# Patient Record
Sex: MALE | Race: BLACK OR AFRICAN AMERICAN | NOT HISPANIC OR LATINO
[De-identification: names, ages, dates, MRNs, and addresses within clinical notes are randomized per-mention and may not be internally consistent; named-entity substitution may affect disease eponyms.]

---

## 2017-12-06 ENCOUNTER — APPOINTMENT (OUTPATIENT)
Dept: OTOLARYNGOLOGY | Facility: CLINIC | Age: 57
End: 2017-12-06
Payer: COMMERCIAL

## 2017-12-06 VITALS
BODY MASS INDEX: 33.11 KG/M2 | TEMPERATURE: 98.2 F | WEIGHT: 206 LBS | HEART RATE: 109 BPM | DIASTOLIC BLOOD PRESSURE: 77 MMHG | HEIGHT: 66 IN | SYSTOLIC BLOOD PRESSURE: 143 MMHG | OXYGEN SATURATION: 97 %

## 2017-12-06 PROCEDURE — 69210 REMOVE IMPACTED EAR WAX UNI: CPT

## 2017-12-06 PROCEDURE — 99213 OFFICE O/P EST LOW 20 MIN: CPT | Mod: 25

## 2017-12-06 PROCEDURE — 31575 DIAGNOSTIC LARYNGOSCOPY: CPT

## 2018-02-26 ENCOUNTER — APPOINTMENT (OUTPATIENT)
Dept: UROLOGY | Facility: CLINIC | Age: 58
End: 2018-02-26

## 2018-06-18 ENCOUNTER — APPOINTMENT (OUTPATIENT)
Dept: UROLOGY | Facility: CLINIC | Age: 58
End: 2018-06-18
Payer: COMMERCIAL

## 2018-06-18 VITALS — SYSTOLIC BLOOD PRESSURE: 155 MMHG | HEART RATE: 69 BPM | DIASTOLIC BLOOD PRESSURE: 78 MMHG | TEMPERATURE: 98.4 F

## 2018-06-18 PROCEDURE — 99203 OFFICE O/P NEW LOW 30 MIN: CPT

## 2018-06-19 LAB
APPEARANCE: CLEAR
BACTERIA: NEGATIVE
BILIRUBIN URINE: NEGATIVE
BLOOD URINE: NEGATIVE
COLOR: YELLOW
GLUCOSE QUALITATIVE U: NEGATIVE MG/DL
HYALINE CASTS: 1 /LPF
KETONES URINE: NEGATIVE
LEUKOCYTE ESTERASE URINE: NEGATIVE
MICROSCOPIC-UA: NORMAL
NITRITE URINE: NEGATIVE
PH URINE: 5.5
PROTEIN URINE: NEGATIVE MG/DL
PSA FREE FLD-MCNC: 22.3
PSA FREE SERPL-MCNC: 0.72 NG/ML
PSA SERPL-MCNC: 3.23 NG/ML
RED BLOOD CELLS URINE: 2 /HPF
SPECIFIC GRAVITY URINE: 1.02
SQUAMOUS EPITHELIAL CELLS: 0 /HPF
UROBILINOGEN URINE: NEGATIVE MG/DL
WHITE BLOOD CELLS URINE: 0 /HPF

## 2018-06-20 ENCOUNTER — RESULT REVIEW (OUTPATIENT)
Age: 58
End: 2018-06-20

## 2018-06-20 LAB — BACTERIA UR CULT: NORMAL

## 2018-08-01 ENCOUNTER — APPOINTMENT (OUTPATIENT)
Dept: UROLOGY | Facility: CLINIC | Age: 58
End: 2018-08-01
Payer: COMMERCIAL

## 2018-08-01 VITALS
OXYGEN SATURATION: 98 % | TEMPERATURE: 98.8 F | HEIGHT: 66 IN | DIASTOLIC BLOOD PRESSURE: 85 MMHG | BODY MASS INDEX: 33.11 KG/M2 | HEART RATE: 66 BPM | WEIGHT: 206 LBS | SYSTOLIC BLOOD PRESSURE: 156 MMHG

## 2018-08-01 PROCEDURE — 99213 OFFICE O/P EST LOW 20 MIN: CPT

## 2018-08-06 ENCOUNTER — FORM ENCOUNTER (OUTPATIENT)
Age: 58
End: 2018-08-06

## 2018-08-07 ENCOUNTER — OUTPATIENT (OUTPATIENT)
Dept: OUTPATIENT SERVICES | Facility: HOSPITAL | Age: 58
LOS: 1 days | End: 2018-08-07
Payer: COMMERCIAL

## 2018-08-07 ENCOUNTER — APPOINTMENT (OUTPATIENT)
Dept: ULTRASOUND IMAGING | Facility: HOSPITAL | Age: 58
End: 2018-08-07

## 2018-08-07 PROCEDURE — 76770 US EXAM ABDO BACK WALL COMP: CPT | Mod: 26

## 2018-08-07 PROCEDURE — 76770 US EXAM ABDO BACK WALL COMP: CPT

## 2018-08-13 ENCOUNTER — APPOINTMENT (OUTPATIENT)
Dept: UROLOGY | Facility: CLINIC | Age: 58
End: 2018-08-13
Payer: COMMERCIAL

## 2018-08-13 VITALS — TEMPERATURE: 97.9 F | HEART RATE: 97 BPM | DIASTOLIC BLOOD PRESSURE: 80 MMHG | SYSTOLIC BLOOD PRESSURE: 132 MMHG

## 2018-08-13 PROCEDURE — 52000 CYSTOURETHROSCOPY: CPT

## 2018-11-14 ENCOUNTER — APPOINTMENT (OUTPATIENT)
Dept: UROLOGY | Facility: CLINIC | Age: 58
End: 2018-11-14
Payer: COMMERCIAL

## 2018-11-14 VITALS
RESPIRATION RATE: 18 BRPM | WEIGHT: 232 LBS | DIASTOLIC BLOOD PRESSURE: 78 MMHG | OXYGEN SATURATION: 100 % | TEMPERATURE: 98.7 F | BODY MASS INDEX: 38.65 KG/M2 | SYSTOLIC BLOOD PRESSURE: 144 MMHG | HEIGHT: 65 IN | HEART RATE: 60 BPM

## 2018-11-14 PROCEDURE — 99213 OFFICE O/P EST LOW 20 MIN: CPT

## 2019-03-04 ENCOUNTER — APPOINTMENT (OUTPATIENT)
Dept: UROLOGY | Facility: CLINIC | Age: 59
End: 2019-03-04

## 2019-04-08 ENCOUNTER — APPOINTMENT (OUTPATIENT)
Dept: UROLOGY | Facility: CLINIC | Age: 59
End: 2019-04-08
Payer: COMMERCIAL

## 2019-04-08 VITALS — HEART RATE: 64 BPM | TEMPERATURE: 98.8 F | SYSTOLIC BLOOD PRESSURE: 146 MMHG | DIASTOLIC BLOOD PRESSURE: 79 MMHG

## 2019-04-08 PROCEDURE — 99213 OFFICE O/P EST LOW 20 MIN: CPT

## 2019-04-08 NOTE — ASSESSMENT
[FreeTextEntry1] : 57yo male with BPH, episode of acute urinary retention July 2018\par Approx 70gm prostate with large median lobe on cystoscopy August 2018\par Continue tamsulosin BID, finasteride daily\par Check PSA today\par f/u 6 months\par

## 2019-04-08 NOTE — LETTER BODY
[Dear  ___] : Dear  [unfilled], [Courtesy Letter:] : I had the pleasure of seeing your patient, [unfilled], in my office today. [Please see my note below.] : Please see my note below. [Consult Closing:] : Thank you very much for allowing me to participate in the care of this patient.  If you have any questions, please do not hesitate to contact me. [Sincerely,] : Sincerely, [FreeTextEntry2] : Yoon Durán MD\par [address]\[place] [address] \par  [FreeTextEntry3] : Jack Gallego MD\par Urologic Oncology\par Department of Urology\par [facility]\par \par \par [employer] at [facility] \par \par

## 2019-04-08 NOTE — HISTORY OF PRESENT ILLNESS
[FreeTextEntry1] : This is a 59yo male referred for evaluation of BPH. He reports nocturia x3, frequency, post void dribbling. He recently started tamsulosin twice daily prescribed by his PCP which has been helping. No recent PSA or urine testing available. \par \par 8/01/18 Here for f/u. Developed acute retention 4 days ago and he placed at [facility]. No other testing was performed. Here for trial of void\par \par 11/14/18 Here for f/u. Started finasteride in August. Had cystoscopy which showed enlarged prostate with large median lobe. He has not noticed much improvement since starting medication but he is satisfied. \par \par 4/08/19 Here for f/u. Doing well, satisfied with voiding symptoms.  [None] : None

## 2019-04-08 NOTE — PHYSICAL EXAM
[General Appearance - Well Developed] : well developed [General Appearance - Well Nourished] : well nourished [Normal Appearance] : normal appearance [Well Groomed] : well groomed [General Appearance - In No Acute Distress] : no acute distress [Abdomen Soft] : soft [Abdomen Tenderness] : non-tender [Costovertebral Angle Tenderness] : no ~M costovertebral angle tenderness [Prostate Tenderness] : the prostate was not tender [No Prostate Nodules] : no prostate nodules [Prostate Size ___ (0-4)] : prostate size [unfilled] (scale: 0-4) [Oriented To Time, Place, And Person] : oriented to person, place, and time [Affect] : the affect was normal [Mood] : the mood was normal [Not Anxious] : not anxious [Normal Station and Gait] : the gait and station were normal for the patient's age [No Focal Deficits] : no focal deficits

## 2019-04-09 LAB
PSA FREE FLD-MCNC: 18 %
PSA FREE SERPL-MCNC: 0.35 NG/ML
PSA SERPL-MCNC: 1.98 NG/ML

## 2019-04-10 ENCOUNTER — RESULT REVIEW (OUTPATIENT)
Age: 59
End: 2019-04-10

## 2019-10-07 ENCOUNTER — APPOINTMENT (OUTPATIENT)
Dept: UROLOGY | Facility: CLINIC | Age: 59
End: 2019-10-07

## 2020-02-26 ENCOUNTER — APPOINTMENT (OUTPATIENT)
Dept: UROLOGY | Facility: CLINIC | Age: 60
End: 2020-02-26
Payer: COMMERCIAL

## 2020-02-26 VITALS — DIASTOLIC BLOOD PRESSURE: 80 MMHG | HEART RATE: 50 BPM | SYSTOLIC BLOOD PRESSURE: 138 MMHG | TEMPERATURE: 97.7 F

## 2020-02-26 PROCEDURE — 99213 OFFICE O/P EST LOW 20 MIN: CPT

## 2020-02-26 NOTE — ASSESSMENT
[FreeTextEntry1] : 60yo male with BPH, episode of acute urinary retention July 2018\par Approx 70gm prostate with large median lobe on cystoscopy August 2018\par Continue tamsulosin BID, finasteride daily\par Check PSA today\par f/u 6 months\par

## 2020-02-26 NOTE — PHYSICAL EXAM
[General Appearance - Well Nourished] : well nourished [Normal Appearance] : normal appearance [General Appearance - Well Developed] : well developed [Abdomen Soft] : soft [Well Groomed] : well groomed [General Appearance - In No Acute Distress] : no acute distress [Abdomen Tenderness] : non-tender [Costovertebral Angle Tenderness] : no ~M costovertebral angle tenderness [Affect] : the affect was normal [Oriented To Time, Place, And Person] : oriented to person, place, and time [FreeTextEntry1] : FREDI 4/2019 no nodules [Mood] : the mood was normal [Not Anxious] : not anxious [No Focal Deficits] : no focal deficits [Normal Station and Gait] : the gait and station were normal for the patient's age

## 2020-02-26 NOTE — HISTORY OF PRESENT ILLNESS
[FreeTextEntry1] : This is a 57yo male referred for evaluation of BPH. He reports nocturia x3, frequency, post void dribbling. He recently started tamsulosin twice daily prescribed by his PCP which has been helping. No recent PSA or urine testing available. \par \par 8/01/18 Here for f/u. Developed acute retention 4 days ago and he placed at [facility]. No other testing was performed. Here for trial of void\par \par 11/14/18 Here for f/u. Started finasteride in August. Had cystoscopy which showed enlarged prostate with large median lobe. He has not noticed much improvement since starting medication but he is satisfied. \par \par 4/08/19 Here for f/u. Doing well, satisfied with voiding symptoms. \par \par 2/26/20 Here for f/u. Doing well, satisfied with voiding symptoms. + retrograde ejaculation.  [None] : None

## 2020-02-26 NOTE — LETTER BODY
[Dear  ___] : Dear  [unfilled], [Consult Closing:] : Thank you very much for allowing me to participate in the care of this patient.  If you have any questions, please do not hesitate to contact me. [Courtesy Letter:] : I had the pleasure of seeing your patient, [unfilled], in my office today. [Please see my note below.] : Please see my note below. [FreeTextEntry2] : Yoon Durán MD\par [address]\[place] [address] \par  [FreeTextEntry3] : Jack Gallego MD\par Urologic Oncology\par Department of Urology\par [facility]\par \par \par [employer] [facility] \par \par  [Sincerely,] : Sincerely,

## 2020-02-27 LAB — PSA SERPL-MCNC: 2.51 NG/ML

## 2020-08-31 ENCOUNTER — APPOINTMENT (OUTPATIENT)
Dept: UROLOGY | Facility: CLINIC | Age: 60
End: 2020-08-31
Payer: COMMERCIAL

## 2020-08-31 VITALS — SYSTOLIC BLOOD PRESSURE: 138 MMHG | TEMPERATURE: 98 F | HEART RATE: 74 BPM | DIASTOLIC BLOOD PRESSURE: 74 MMHG

## 2020-08-31 PROCEDURE — 99213 OFFICE O/P EST LOW 20 MIN: CPT

## 2020-08-31 NOTE — LETTER BODY
[Dear  ___] : Dear  [unfilled], [Courtesy Letter:] : I had the pleasure of seeing your patient, [unfilled], in my office today. [Please see my note below.] : Please see my note below. [Sincerely,] : Sincerely, [Consult Closing:] : Thank you very much for allowing me to participate in the care of this patient.  If you have any questions, please do not hesitate to contact me. [FreeTextEntry3] : Jack Gallego MD\par Urologic Oncology\par Department of Urology\par [facility]\par \par \par [employer] at [facility] \par \par  [FreeTextEntry2] : Yoon Durán MD\par [address]\[place] [address] \par

## 2020-08-31 NOTE — HISTORY OF PRESENT ILLNESS
[FreeTextEntry1] : This is a 59yo male referred for evaluation of BPH. He reports nocturia x3, frequency, post void dribbling. He recently started tamsulosin twice daily prescribed by his PCP which has been helping. No recent PSA or urine testing available. \par \par 8/01/18 Here for f/u. Developed acute retention 4 days ago and he placed at [facility]. No other testing was performed. Here for trial of void\par \par 11/14/18 Here for f/u. Started finasteride in August. Had cystoscopy which showed enlarged prostate with large median lobe. He has not noticed much improvement since starting medication but he is satisfied. \par \par 4/08/19 Here for f/u. Doing well, satisfied with voiding symptoms. \par \par 2/26/20 Here for f/u. Doing well, satisfied with voiding symptoms. + retrograde ejaculation. \par \par 8/31/20 Here for f/u. Doing well, satisfied with medications.  [None] : None

## 2020-08-31 NOTE — ASSESSMENT
[FreeTextEntry1] : 61yo male with BPH, episode of acute urinary retention July 2018\par Approx 70gm prostate with large median lobe on cystoscopy August 2018\par Continue tamsulosin BID, finasteride daily. Renewed today\par Reviewed PSA results\par f/u 6 months\par

## 2021-02-24 ENCOUNTER — APPOINTMENT (OUTPATIENT)
Dept: UROLOGY | Facility: CLINIC | Age: 61
End: 2021-02-24
Payer: COMMERCIAL

## 2021-02-24 VITALS — DIASTOLIC BLOOD PRESSURE: 71 MMHG | HEART RATE: 80 BPM | TEMPERATURE: 97.9 F | SYSTOLIC BLOOD PRESSURE: 144 MMHG

## 2021-02-24 PROCEDURE — 99213 OFFICE O/P EST LOW 20 MIN: CPT

## 2021-02-24 PROCEDURE — 99072 ADDL SUPL MATRL&STAF TM PHE: CPT

## 2021-02-24 NOTE — LETTER BODY
[Dear  ___] : Dear  [unfilled], [Courtesy Letter:] : I had the pleasure of seeing your patient, [unfilled], in my office today. [Please see my note below.] : Please see my note below. [Consult Closing:] : Thank you very much for allowing me to participate in the care of this patient.  If you have any questions, please do not hesitate to contact me. [Sincerely,] : Sincerely, [FreeTextEntry2] : Yoon Durán MD\par [address]\[place] [address] \par  [FreeTextEntry3] : Jack Gallego MD\par Urologic Oncology\par Department of Urology\par [facility]\par \par \par [employer] at [facility] \par \par

## 2021-02-24 NOTE — HISTORY OF PRESENT ILLNESS
[FreeTextEntry1] : This is a 59yo male referred for evaluation of BPH. He reports nocturia x3, frequency, post void dribbling. He recently started tamsulosin twice daily prescribed by his PCP which has been helping. No recent PSA or urine testing available. \par \par 8/01/18 Here for f/u. Developed acute retention 4 days ago and he placed at [facility]. No other testing was performed. Here for trial of void\par \par 11/14/18 Here for f/u. Started finasteride in August. Had cystoscopy which showed enlarged prostate with large median lobe. He has not noticed much improvement since starting medication but he is satisfied. \par \par 4/08/19 Here for f/u. Doing well, satisfied with voiding symptoms. \par \par 2/26/20 Here for f/u. Doing well, satisfied with voiding symptoms. + retrograde ejaculation. \par \par 8/31/20 Here for f/u. Doing well, satisfied with medications. \par \par 2/24/21 Here for f/u. Doing well, satisfied with medications.  [None] : None

## 2021-02-24 NOTE — ASSESSMENT
[FreeTextEntry1] : 61yo male with BPH, episode of acute urinary retention July 2018\par Approx 70gm prostate with large median lobe on cystoscopy August 2018\par Continue tamsulosin BID, finasteride daily. \par Reviewed PSA results. Recheck today\par f/u 12 months\par

## 2021-02-24 NOTE — PHYSICAL EXAM
[General Appearance - Well Developed] : well developed [Normal Appearance] : normal appearance [General Appearance - Well Nourished] : well nourished [Well Groomed] : well groomed [General Appearance - In No Acute Distress] : no acute distress [Abdomen Soft] : soft [Abdomen Tenderness] : non-tender [Costovertebral Angle Tenderness] : no ~M costovertebral angle tenderness [Urinary Bladder Findings] : the bladder was normal on palpation [No Prostate Nodules] : no prostate nodules [Oriented To Time, Place, And Person] : oriented to person, place, and time [Affect] : the affect was normal [Mood] : the mood was normal [Not Anxious] : not anxious [Normal Station and Gait] : the gait and station were normal for the patient's age [No Focal Deficits] : no focal deficits

## 2021-02-25 ENCOUNTER — NON-APPOINTMENT (OUTPATIENT)
Age: 61
End: 2021-02-25

## 2021-02-25 LAB — PSA SERPL-MCNC: 2.62 NG/ML

## 2022-03-02 ENCOUNTER — APPOINTMENT (OUTPATIENT)
Dept: UROLOGY | Facility: CLINIC | Age: 62
End: 2022-03-02
Payer: COMMERCIAL

## 2022-03-02 VITALS — DIASTOLIC BLOOD PRESSURE: 83 MMHG | HEART RATE: 71 BPM | SYSTOLIC BLOOD PRESSURE: 151 MMHG | TEMPERATURE: 98 F

## 2022-03-02 DIAGNOSIS — N13.8 BENIGN PROSTATIC HYPERPLASIA WITH LOWER URINARY TRACT SYMPMS: ICD-10-CM

## 2022-03-02 DIAGNOSIS — N40.1 BENIGN PROSTATIC HYPERPLASIA WITH LOWER URINARY TRACT SYMPMS: ICD-10-CM

## 2022-03-02 PROCEDURE — 99213 OFFICE O/P EST LOW 20 MIN: CPT

## 2022-03-02 NOTE — HISTORY OF PRESENT ILLNESS
[FreeTextEntry1] : This is a 59yo male referred for evaluation of BPH. He reports nocturia x3, frequency, post void dribbling. He recently started tamsulosin twice daily prescribed by his PCP which has been helping. No recent PSA or urine testing available. \par \par 8/01/18 Here for f/u. Developed acute retention 4 days ago and he placed at [facility]. No other testing was performed. Here for trial of void\par \par 11/14/18 Here for f/u. Started finasteride in August. Had cystoscopy which showed enlarged prostate with large median lobe. He has not noticed much improvement since starting medication but he is satisfied. \par \par 4/08/19 Here for f/u. Doing well, satisfied with voiding symptoms. \par \par 2/26/20 Here for f/u. Doing well, satisfied with voiding symptoms. + retrograde ejaculation. \par \par 8/31/20 Here for f/u. Doing well, satisfied with medications. \par \par 2/24/21 Here for f/u. Doing well, satisfied with medications. \par \par 3/02/22 Here for f/u. Doing well, satisfied with medications. PSA last year = 2.62 (around 5 corrected for finasteride) but stable [None] : None

## 2022-03-02 NOTE — LETTER BODY
[Dear  ___] : Dear  [unfilled], [Courtesy Letter:] : I had the pleasure of seeing your patient, [unfilled], in my office today. [Please see my note below.] : Please see my note below. [Consult Closing:] : Thank you very much for allowing me to participate in the care of this patient.  If you have any questions, please do not hesitate to contact me. [Sincerely,] : Sincerely, [FreeTextEntry2] : Yoon Durán MD\par [address]\[place] [address] \par  [FreeTextEntry3] : Jack Gallego MD\par Urologic Oncology\par Department of Urology\par [facility]\par \par \par [employer] [facility] \par \par

## 2022-03-02 NOTE — PHYSICAL EXAM
[General Appearance - Well Developed] : well developed [General Appearance - Well Nourished] : well nourished [Normal Appearance] : normal appearance [Well Groomed] : well groomed [General Appearance - In No Acute Distress] : no acute distress [Abdomen Soft] : soft [Abdomen Tenderness] : non-tender [Costovertebral Angle Tenderness] : no ~M costovertebral angle tenderness [Urinary Bladder Findings] : the bladder was normal on palpation [FreeTextEntry1] : FREDI 2/2021 no nodules [Oriented To Time, Place, And Person] : oriented to person, place, and time [Affect] : the affect was normal [Mood] : the mood was normal [Not Anxious] : not anxious [Normal Station and Gait] : the gait and station were normal for the patient's age [No Focal Deficits] : no focal deficits

## 2022-03-02 NOTE — ASSESSMENT
[FreeTextEntry1] : 62yo male with BPH, episode of acute urinary retention July 2018\par Approx 70gm prostate with large median lobe on cystoscopy August 2018\par Continue tamsulosin BID, finasteride daily. Medications renewed today\par Reviewed PSA results from 2021. Recheck today\par f/u 12 months\par

## 2022-03-03 LAB — PSA SERPL-MCNC: 3.8 NG/ML

## 2022-03-07 ENCOUNTER — NON-APPOINTMENT (OUTPATIENT)
Age: 62
End: 2022-03-07

## 2022-03-21 ENCOUNTER — APPOINTMENT (OUTPATIENT)
Dept: UROLOGY | Facility: CLINIC | Age: 62
End: 2022-03-21

## 2022-06-06 ENCOUNTER — APPOINTMENT (OUTPATIENT)
Dept: UROLOGY | Facility: CLINIC | Age: 62
End: 2022-06-06
Payer: COMMERCIAL

## 2022-06-06 VITALS
HEIGHT: 65 IN | HEART RATE: 81 BPM | DIASTOLIC BLOOD PRESSURE: 81 MMHG | WEIGHT: 253 LBS | TEMPERATURE: 97.7 F | BODY MASS INDEX: 42.15 KG/M2 | SYSTOLIC BLOOD PRESSURE: 127 MMHG

## 2022-06-06 PROCEDURE — 99215 OFFICE O/P EST HI 40 MIN: CPT | Mod: 25

## 2022-06-06 PROCEDURE — 51798 US URINE CAPACITY MEASURE: CPT

## 2022-06-06 RX ORDER — FINASTERIDE 5 MG/1
5 TABLET, FILM COATED ORAL DAILY
Qty: 90 | Refills: 3 | Status: ACTIVE | COMMUNITY
Start: 2018-08-13 | End: 1900-01-01

## 2022-06-06 NOTE — PHYSICAL EXAM
[General Appearance - Well Developed] : well developed [Normal Appearance] : normal appearance [] : no respiratory distress [Exaggerated Use Of Accessory Muscles For Inspiration] : no accessory muscle use [Abdomen Tenderness] : non-tender [Oriented To Time, Place, And Person] : oriented to person, place, and time [Affect] : the affect was normal [Mood] : the mood was normal [Normal Station and Gait] : the gait and station were normal for the patient's age [No Focal Deficits] : no focal deficits [FreeTextEntry1] : catheter intact at time of removal

## 2022-06-06 NOTE — HISTORY OF PRESENT ILLNESS
[FreeTextEntry1] : This is a 57yo male referred for evaluation of BPH. He reports nocturia x3, frequency, post void dribbling. He recently started tamsulosin twice daily prescribed by his PCP which has been helping. No recent PSA or urine testing available. \par \par 8/01/18 Here for f/u. Developed acute retention 4 days ago and he placed at [facility]. No other testing was performed. Here for trial of void\par \par 11/14/18 Here for f/u. Started finasteride in August. Had cystoscopy which showed enlarged prostate with large median lobe. He has not noticed much improvement since starting medication but he is satisfied. \par \par 4/08/19 Here for f/u. Doing well, satisfied with voiding symptoms. \par \par 2/26/20 Here for f/u. Doing well, satisfied with voiding symptoms. + retrograde ejaculation. \par \par 8/31/20 Here for f/u. Doing well, satisfied with medications. \par \par 2/24/21 Here for f/u. Doing well, satisfied with medications. \par \par 3/02/22 Here for f/u. Doing well, satisfied with medications. PSA last year = 2.62 (around 5 corrected for finasteride) but stabl\par \par 6/6/2022 Here for catheter removal. Developed urinary retention 1 week ago. Catheter placed at [facility]. Stopped taking tamsulosin and finasteride about eight months ago. He is feeling well, no fevers, chills. Catheter has been draining clear yellow urine.

## 2022-06-06 NOTE — ASSESSMENT
[FreeTextEntry1] : 61yo male with BPH, episode of acute urinary retention 1 week ago\par Approx 70gm prostate with large median lobe on cystoscopy August 2018\par TOV passed today, PVR  70 ml \par Strict RTC precautions reviewed with patient. Advised pt return to office or go ER if experiencing severe pain, unable to urinate, gross hematuria, fevers, chills.\par Continue tamsulosin BID, finasteride daily. Medications renewed today\par F/u 3 months.

## 2022-08-16 ENCOUNTER — EMERGENCY (EMERGENCY)
Facility: HOSPITAL | Age: 62
LOS: 1 days | Discharge: HOME | End: 2022-08-16
Attending: EMERGENCY MEDICINE | Admitting: EMERGENCY MEDICINE
Payer: COMMERCIAL

## 2022-08-16 VITALS
OXYGEN SATURATION: 98 % | SYSTOLIC BLOOD PRESSURE: 162 MMHG | HEART RATE: 81 BPM | DIASTOLIC BLOOD PRESSURE: 88 MMHG | RESPIRATION RATE: 17 BRPM

## 2022-08-16 VITALS
DIASTOLIC BLOOD PRESSURE: 87 MMHG | WEIGHT: 225.09 LBS | HEART RATE: 86 BPM | SYSTOLIC BLOOD PRESSURE: 172 MMHG | RESPIRATION RATE: 18 BRPM | TEMPERATURE: 99 F | OXYGEN SATURATION: 99 % | HEIGHT: 65 IN

## 2022-08-16 DIAGNOSIS — Z20.822 CONTACT WITH AND (SUSPECTED) EXPOSURE TO COVID-19: ICD-10-CM

## 2022-08-16 DIAGNOSIS — K06.8 OTHER SPECIFIED DISORDERS OF GINGIVA AND EDENTULOUS ALVEOLAR RIDGE: ICD-10-CM

## 2022-08-16 DIAGNOSIS — R73.03 PREDIABETES: ICD-10-CM

## 2022-08-16 DIAGNOSIS — E87.6 HYPOKALEMIA: ICD-10-CM

## 2022-08-16 DIAGNOSIS — I10 ESSENTIAL (PRIMARY) HYPERTENSION: ICD-10-CM

## 2022-08-16 DIAGNOSIS — R51.9 HEADACHE, UNSPECIFIED: ICD-10-CM

## 2022-08-16 LAB
ALBUMIN SERPL ELPH-MCNC: 4.6 G/DL — SIGNIFICANT CHANGE UP (ref 3.3–5)
ALBUMIN SERPL ELPH-MCNC: 4.7 G/DL — SIGNIFICANT CHANGE UP (ref 3.3–5)
ALP SERPL-CCNC: 84 U/L — SIGNIFICANT CHANGE UP (ref 40–120)
ALP SERPL-CCNC: SIGNIFICANT CHANGE UP (ref 40–120)
ALT FLD-CCNC: 14 U/L — SIGNIFICANT CHANGE UP (ref 10–45)
ALT FLD-CCNC: SIGNIFICANT CHANGE UP (ref 10–45)
ANION GAP SERPL CALC-SCNC: 11 MMOL/L — SIGNIFICANT CHANGE UP (ref 5–17)
ANION GAP SERPL CALC-SCNC: 12 MMOL/L — SIGNIFICANT CHANGE UP (ref 5–17)
APTT BLD: 32.4 SEC — SIGNIFICANT CHANGE UP (ref 27.5–35.5)
AST SERPL-CCNC: 17 U/L — SIGNIFICANT CHANGE UP (ref 10–40)
AST SERPL-CCNC: SIGNIFICANT CHANGE UP (ref 10–40)
BASOPHILS # BLD AUTO: 0.02 K/UL — SIGNIFICANT CHANGE UP (ref 0–0.2)
BASOPHILS NFR BLD AUTO: 0.2 % — SIGNIFICANT CHANGE UP (ref 0–2)
BILIRUB SERPL-MCNC: 0.5 MG/DL — SIGNIFICANT CHANGE UP (ref 0.2–1.2)
BILIRUB SERPL-MCNC: 0.6 MG/DL — SIGNIFICANT CHANGE UP (ref 0.2–1.2)
BUN SERPL-MCNC: 11 MG/DL — SIGNIFICANT CHANGE UP (ref 7–23)
BUN SERPL-MCNC: 12 MG/DL — SIGNIFICANT CHANGE UP (ref 7–23)
CALCIUM SERPL-MCNC: 8.8 MG/DL — SIGNIFICANT CHANGE UP (ref 8.4–10.5)
CALCIUM SERPL-MCNC: 9.6 MG/DL — SIGNIFICANT CHANGE UP (ref 8.4–10.5)
CHLORIDE SERPL-SCNC: 92 MMOL/L — LOW (ref 96–108)
CHLORIDE SERPL-SCNC: 95 MMOL/L — LOW (ref 96–108)
CO2 SERPL-SCNC: 27 MMOL/L — SIGNIFICANT CHANGE UP (ref 22–31)
CO2 SERPL-SCNC: 28 MMOL/L — SIGNIFICANT CHANGE UP (ref 22–31)
CREAT SERPL-MCNC: 0.98 MG/DL — SIGNIFICANT CHANGE UP (ref 0.5–1.3)
CREAT SERPL-MCNC: 0.99 MG/DL — SIGNIFICANT CHANGE UP (ref 0.5–1.3)
EGFR: 86 ML/MIN/1.73M2 — SIGNIFICANT CHANGE UP
EGFR: 87 ML/MIN/1.73M2 — SIGNIFICANT CHANGE UP
EOSINOPHIL # BLD AUTO: 0.01 K/UL — SIGNIFICANT CHANGE UP (ref 0–0.5)
EOSINOPHIL NFR BLD AUTO: 0.1 % — SIGNIFICANT CHANGE UP (ref 0–6)
GLUCOSE SERPL-MCNC: 128 MG/DL — HIGH (ref 70–99)
GLUCOSE SERPL-MCNC: 150 MG/DL — HIGH (ref 70–99)
HCT VFR BLD CALC: 45.5 % — SIGNIFICANT CHANGE UP (ref 39–50)
HGB BLD-MCNC: 16 G/DL — SIGNIFICANT CHANGE UP (ref 13–17)
IMM GRANULOCYTES NFR BLD AUTO: 0.3 % — SIGNIFICANT CHANGE UP (ref 0–1.5)
INR BLD: 1.01 — SIGNIFICANT CHANGE UP (ref 0.88–1.16)
LYMPHOCYTES # BLD AUTO: 1.48 K/UL — SIGNIFICANT CHANGE UP (ref 1–3.3)
LYMPHOCYTES # BLD AUTO: 12.4 % — LOW (ref 13–44)
MAGNESIUM SERPL-MCNC: 2.4 MG/DL — SIGNIFICANT CHANGE UP (ref 1.6–2.6)
MCHC RBC-ENTMCNC: 30.2 PG — SIGNIFICANT CHANGE UP (ref 27–34)
MCHC RBC-ENTMCNC: 35.2 GM/DL — SIGNIFICANT CHANGE UP (ref 32–36)
MCV RBC AUTO: 86 FL — SIGNIFICANT CHANGE UP (ref 80–100)
MONOCYTES # BLD AUTO: 0.98 K/UL — HIGH (ref 0–0.9)
MONOCYTES NFR BLD AUTO: 8.2 % — SIGNIFICANT CHANGE UP (ref 2–14)
NEUTROPHILS # BLD AUTO: 9.37 K/UL — HIGH (ref 1.8–7.4)
NEUTROPHILS NFR BLD AUTO: 78.8 % — HIGH (ref 43–77)
NRBC # BLD: 0 /100 WBCS — SIGNIFICANT CHANGE UP (ref 0–0)
PLATELET # BLD AUTO: 215 K/UL — SIGNIFICANT CHANGE UP (ref 150–400)
POTASSIUM SERPL-MCNC: 3.1 MMOL/L — LOW (ref 3.5–5.3)
POTASSIUM SERPL-MCNC: SIGNIFICANT CHANGE UP (ref 3.5–5.3)
POTASSIUM SERPL-SCNC: 3.1 MMOL/L — LOW (ref 3.5–5.3)
POTASSIUM SERPL-SCNC: SIGNIFICANT CHANGE UP (ref 3.5–5.3)
PROT SERPL-MCNC: 7.9 G/DL — SIGNIFICANT CHANGE UP (ref 6–8.3)
PROT SERPL-MCNC: 8.7 G/DL — HIGH (ref 6–8.3)
PROTHROM AB SERPL-ACNC: 12 SEC — SIGNIFICANT CHANGE UP (ref 10.5–13.4)
RBC # BLD: 5.29 M/UL — SIGNIFICANT CHANGE UP (ref 4.2–5.8)
RBC # FLD: 11.9 % — SIGNIFICANT CHANGE UP (ref 10.3–14.5)
SARS-COV-2 RNA SPEC QL NAA+PROBE: NEGATIVE — SIGNIFICANT CHANGE UP
SODIUM SERPL-SCNC: 131 MMOL/L — LOW (ref 135–145)
SODIUM SERPL-SCNC: 134 MMOL/L — LOW (ref 135–145)
WBC # BLD: 11.9 K/UL — HIGH (ref 3.8–10.5)
WBC # FLD AUTO: 11.9 K/UL — HIGH (ref 3.8–10.5)

## 2022-08-16 PROCEDURE — 85730 THROMBOPLASTIN TIME PARTIAL: CPT

## 2022-08-16 PROCEDURE — 99284 EMERGENCY DEPT VISIT MOD MDM: CPT

## 2022-08-16 PROCEDURE — 85025 COMPLETE CBC W/AUTO DIFF WBC: CPT

## 2022-08-16 PROCEDURE — 36415 COLL VENOUS BLD VENIPUNCTURE: CPT

## 2022-08-16 PROCEDURE — 80053 COMPREHEN METABOLIC PANEL: CPT

## 2022-08-16 PROCEDURE — 85610 PROTHROMBIN TIME: CPT

## 2022-08-16 PROCEDURE — 83735 ASSAY OF MAGNESIUM: CPT

## 2022-08-16 PROCEDURE — 87635 SARS-COV-2 COVID-19 AMP PRB: CPT

## 2022-08-16 PROCEDURE — 96374 THER/PROPH/DIAG INJ IV PUSH: CPT

## 2022-08-16 PROCEDURE — 96375 TX/PRO/DX INJ NEW DRUG ADDON: CPT

## 2022-08-16 PROCEDURE — 99285 EMERGENCY DEPT VISIT HI MDM: CPT | Mod: 25

## 2022-08-16 RX ORDER — POTASSIUM CHLORIDE 20 MEQ
40 PACKET (EA) ORAL ONCE
Refills: 0 | Status: COMPLETED | OUTPATIENT
Start: 2022-08-16 | End: 2022-08-16

## 2022-08-16 RX ORDER — KETOROLAC TROMETHAMINE 30 MG/ML
15 SYRINGE (ML) INJECTION ONCE
Refills: 0 | Status: DISCONTINUED | OUTPATIENT
Start: 2022-08-16 | End: 2022-08-16

## 2022-08-16 RX ORDER — METOCLOPRAMIDE HCL 10 MG
10 TABLET ORAL ONCE
Refills: 0 | Status: COMPLETED | OUTPATIENT
Start: 2022-08-16 | End: 2022-08-16

## 2022-08-16 RX ORDER — SODIUM CHLORIDE 9 MG/ML
1000 INJECTION INTRAMUSCULAR; INTRAVENOUS; SUBCUTANEOUS ONCE
Refills: 0 | Status: COMPLETED | OUTPATIENT
Start: 2022-08-16 | End: 2022-08-16

## 2022-08-16 RX ADMIN — Medication 15 MILLIGRAM(S): at 10:31

## 2022-08-16 RX ADMIN — Medication 40 MILLIEQUIVALENT(S): at 13:00

## 2022-08-16 RX ADMIN — SODIUM CHLORIDE 2000 MILLILITER(S): 9 INJECTION INTRAMUSCULAR; INTRAVENOUS; SUBCUTANEOUS at 10:31

## 2022-08-16 RX ADMIN — Medication 10 MILLIGRAM(S): at 10:31

## 2022-08-16 NOTE — ED ADULT NURSE NOTE - OBJECTIVE STATEMENT
Pt c/o HA for x2 weeks, pt had CTH and recently started on HTN meds. Pt took motrin w/o relief, referred to neurologist but unable to get appt for several months. AAOX3, speaking in full clear sentences. No vision changes or dizziness.

## 2022-08-16 NOTE — ED PROVIDER NOTE - OBJECTIVE STATEMENT
62 M pmh htn (recently started on hctz25 mg/triamterene 37.5 mg), predm p/w headache x 2 weeks.  pt reports gradual onset diffuse throbbing HA, improves at times but never resolves.  reports difficulty sleeping and increased stress due to family members health issues over past 2 weeks.  went to [facility] on 8/4, dc w/ ibuprofen for HAs but not helping.  saw pmd and noted BP elevated, started on above listed med and had normal CTH on 8/11.  reports f/u yesterday w/ pmd and BP well controlled but HA not, referred for neuro w/ Dr. Can but unable to get appt until jan 2023.  states this morning noted some bleeding from gums, no other easy  bleeding.  denies f/c, dizziness, fainting, neck pain/stiffness, vision changes/photophobia, dysarthria, dysphagia, chest pain, sob, abd pain, nvd, urinary sxs, numbness/weakness, paresthesia, trauma/fall

## 2022-08-16 NOTE — ED PROVIDER NOTE - NS ED ATTENDING STATEMENT MOD
This was a shared visit with the [facility]. I reviewed and verified the documentation and independently performed the documented:

## 2022-08-16 NOTE — ED PROVIDER NOTE - ATTENDING APP SHARED VISIT CONTRIBUTION OF CARE
intermittent headache/ discomfort x 2 weeks. no trauma, fever, weakness. reports saw pmd, had normal outpatient head ct, started bp meds. neuro intact, well appearing. notes increased stresses at home, suspect tension headache. plan symptomatic treatment, outpatient f/u.

## 2022-08-16 NOTE — ED PROVIDER NOTE - PHYSICAL EXAMINATION
Vitals reviewed  Gen: well appearing, nad, speaking in full sentences  Skin: wwp, no rash/lesions  HEENT: ncat, eomi, no nystagmus, perrla, mmm, no intraoral lesions or bleeding  Neck: supple, no meningeal signs   CV: rrr, no audible m/r/g  Resp: symmetrical expansion, ctab, no w/r/r  Abd: nondistended, soft/nt  Ext: FROM throughout, no peripheral edema, SILT, 5/5 strength, distal pulses 2+  Neuro: alert/oriented x3, no focal deficits, steady gait, no ataxia

## 2022-08-16 NOTE — ED PROVIDER NOTE - PATIENT PORTAL LINK FT
You can access the FollowMyHealth Patient Portal offered by [facility] by registering at the following website: [email or web address]. By joining [facility]’s FollowMyHealth portal, you will also be able to view your health information using other applications (apps) compatible with our system.

## 2022-08-16 NOTE — ED PROVIDER NOTE - NSFOLLOWUPINSTRUCTIONS_ED_ALL_ED_FT
Please continue blood pressure medications as prescribed    try to reduce stress and get plenty of sleep.  Avoid tobacco/alcohol    Follow up with primary care doctor within one week and neurologist as scheduled  You may call our referrals coordinator at [phone] Monday to Friday 11am-7pm for assistance with making an appointment    Headache    A headache is pain or discomfort felt around the head or neck area. The specific cause of a headache may not be found as there are many types including tension headaches, migraine headaches, and cluster headaches. Watch your condition for any changes. Things you can do to manage your pain include taking over the counter and prescription medications as instructed by your health care provider, lying down in a dark quiet room, limiting stress, getting regular sleep, and refraining from alcohol and tobacco products.    SEEK IMMEDIATE MEDICAL CARE IF YOU HAVE ANY OF THE FOLLOWING SYMPTOMS: fever, vomiting, stiff neck, loss of vision, problems with speech, muscle weakness, loss of balance, trouble walking, passing out, or confusion. Please continue blood pressure medications as prescribed    try to reduce stress and get plenty of sleep.  Avoid tobacco/alcohol    Follow up with primary care doctor within one week and neurologist as scheduled  You may call our referrals coordinator at [phone] Monday to Friday 11am-7pm for assistance with making an earlier appointment    Headache    A headache is pain or discomfort felt around the head or neck area. The specific cause of a headache may not be found as there are many types including tension headaches, migraine headaches, and cluster headaches. Watch your condition for any changes. Things you can do to manage your pain include taking over the counter and prescription medications as instructed by your health care provider, lying down in a dark quiet room, limiting stress, getting regular sleep, and refraining from alcohol and tobacco products.    SEEK IMMEDIATE MEDICAL CARE IF YOU HAVE ANY OF THE FOLLOWING SYMPTOMS: fever, vomiting, stiff neck, loss of vision, problems with speech, muscle weakness, loss of balance, trouble walking, passing out, or confusion.

## 2022-08-16 NOTE — ED ADULT TRIAGE NOTE - CHIEF COMPLAINT QUOTE
Pt reports 2 weeks of headache and hypertension, scant oral bleeding this morning. Pt treated w/ ibuprofen w/o relief, normal head ct 8/11. Pt referred to neurologist who cant see him until 2023. Pt started on triamterone hctz on 8/8.

## 2022-08-16 NOTE — ED PROVIDER NOTE - PROGRESS NOTE DETAILS
BP improved on rpt, instructed to continue meds as prescribed. hemolyzed labs, rpt w/ hypokalemia repleted PO.  pt instructed on PO hydration, otc analgesia and will refer to [facility] for earlier neuro f/u.  f/u with pmd as well.  discussed strict return parameters

## 2022-08-16 NOTE — ED PROVIDER NOTE - CLINICAL SUMMARY MEDICAL DECISION MAKING FREE TEXT BOX
62 M pmh htn (recently started on hctz25 mg/triamterene 37.5 mg), predm p/w headache x 2 weeks. diffuse throbbing, gradual onset, improves but never resolves.  taking ibuprofen w/o relief, last took  yesterday.  had normal CTH 8/11, unable to get neuro appt til 2023.  on exam pt htn, hrrr, lungs ctab, no meningeal signs, neuro intact w/ steady gait.  likely stress related vs htn.  no concern for ICH/SAH or infectious etiology.  will obtain labs, give toradol/reglan/ivf and reeval/rpt BP.

## 2022-09-02 PROBLEM — R73.03 PREDIABETES: Chronic | Status: ACTIVE | Noted: 2022-08-16

## 2022-09-02 PROBLEM — I10 ESSENTIAL (PRIMARY) HYPERTENSION: Chronic | Status: ACTIVE | Noted: 2022-08-16

## 2022-09-07 ENCOUNTER — APPOINTMENT (OUTPATIENT)
Dept: UROLOGY | Facility: CLINIC | Age: 62
End: 2022-09-07

## 2022-10-25 ENCOUNTER — NON-APPOINTMENT (OUTPATIENT)
Age: 62
End: 2022-10-25

## 2022-10-25 ENCOUNTER — APPOINTMENT (OUTPATIENT)
Age: 62
End: 2022-10-25

## 2022-10-25 VITALS
TEMPERATURE: 98.3 F | OXYGEN SATURATION: 96 % | HEART RATE: 79 BPM | HEIGHT: 65 IN | BODY MASS INDEX: 37.32 KG/M2 | DIASTOLIC BLOOD PRESSURE: 85 MMHG | SYSTOLIC BLOOD PRESSURE: 157 MMHG | WEIGHT: 224 LBS

## 2022-10-25 PROCEDURE — 99204 OFFICE O/P NEW MOD 45 MIN: CPT

## 2022-10-25 RX ORDER — IBUPROFEN 600 MG/1
600 TABLET, FILM COATED ORAL
Qty: 30 | Refills: 0 | Status: ACTIVE | COMMUNITY
Start: 2022-08-04

## 2022-10-25 NOTE — ASSESSMENT
[FreeTextEntry1] : headaches suggestive of occipital neuralgia, likely related to cervical tension related to recent major stressors\par \par XR C spine\par referred to PT for neck\par also suggested Calm tyrone and trying the headache meditations on that tyrone

## 2022-10-25 NOTE — HISTORY OF PRESENT ILLNESS
[FreeTextEntry1] : MASSIEL JACKSON is a 62 year who presents with \par \par \par Denies diplopia, blurred vision, dysphagia, dysarthria, aphasia, focal weakness or numbness, bowel or bladder dysfunction, imbalance, falls, headaches.\par

## 2022-10-25 NOTE — CONSULT LETTER
[Dear  ___] : Dear  [unfilled], [Consult Letter:] : I had the pleasure of evaluating your patient, [unfilled]. [Please see my note below.] : Please see my note below. [Consult Closing:] : Thank you very much for allowing me to participate in the care of this patient.  If you have any questions, please do not hesitate to contact me. [Sincerely,] : Sincerely, [FreeTextEntry3] : Toño Cast MD

## 2022-10-25 NOTE — PHYSICAL EXAM
[FreeTextEntry1] : General: this is a pleasant patient in no acute distress\par \par HEENT conjunctiva are normal, oropharynx is clear, no tenderness in head\par \par CV: normal pulses, regular rate and rhythm, no peripheral edema noted\par \par Lungs: breathing is non-labored\par \par abd: soft and non-distended\par \par MSK:\par SLR: \par SONYA:\par range of motion:\par tinnels: \par spurling:\par Occipital nerve tenderness:\par \par Mental status:\par Alert and oriented to person, place and time\par Memory: registers 5/5, recalls 5/5\par Attention: serial 7s, world backwards\par Language is normal / abnormal\par \par Cranial Nerves:\par extra-occular movements in tact without nystagmus, normal saccades and smooth pursuit, visual fields full to confrontation, pupils equal, round and reactive to light. Face symmetric and facial strength symmetric, facial sensation symmetric, hearing present bilaterally to finger rub, tongue and uvula midline. neck flexion and extension normal strength.\par \par Motor: normal bulk and tone throughout. no abnormal movements.  Full 5/5 strength uppers and lower extremities proximally and distally\par \par Sensory: in tact and symmetric to vibration, light tough, pin prick, temperature\par \par Cerebellar: normal finger-nose-finger bilaterally\par \par Reflexes: 2+ in the upper and lower extremities and symmetric.  toes are bilaterally downgoing.\par \par Gait: stable, able to tip toe heel and tandem\par \par Stances:\par Romberg: \par Shapened romberg:\par

## 2022-11-03 ENCOUNTER — APPOINTMENT (OUTPATIENT)
Dept: RADIOLOGY | Facility: CLINIC | Age: 62
End: 2022-11-03

## 2022-11-03 PROCEDURE — 72052 X-RAY EXAM NECK SPINE 6/>VWS: CPT

## 2023-01-06 ENCOUNTER — APPOINTMENT (OUTPATIENT)
Dept: OTOLARYNGOLOGY | Facility: CLINIC | Age: 63
End: 2023-01-06
Payer: COMMERCIAL

## 2023-01-06 VITALS
HEIGHT: 65 IN | BODY MASS INDEX: 37.32 KG/M2 | SYSTOLIC BLOOD PRESSURE: 143 MMHG | HEART RATE: 78 BPM | TEMPERATURE: 97.4 F | DIASTOLIC BLOOD PRESSURE: 85 MMHG | WEIGHT: 224 LBS

## 2023-01-06 DIAGNOSIS — Z86.018 PERSONAL HISTORY OF OTHER BENIGN NEOPLASM: ICD-10-CM

## 2023-01-06 DIAGNOSIS — Z87.09 PERSONAL HISTORY OF OTHER DISEASES OF THE RESPIRATORY SYSTEM: ICD-10-CM

## 2023-01-06 DIAGNOSIS — H61.23 IMPACTED CERUMEN, BILATERAL: ICD-10-CM

## 2023-01-06 DIAGNOSIS — D14.1 BENIGN NEOPLASM OF LARYNX: ICD-10-CM

## 2023-01-06 DIAGNOSIS — J38.3 OTHER DISEASES OF VOCAL CORDS: ICD-10-CM

## 2023-01-06 DIAGNOSIS — J34.3 HYPERTROPHY OF NASAL TURBINATES: ICD-10-CM

## 2023-01-06 DIAGNOSIS — R09.81 NASAL CONGESTION: ICD-10-CM

## 2023-01-06 DIAGNOSIS — J34.2 DEVIATED NASAL SEPTUM: ICD-10-CM

## 2023-01-06 DIAGNOSIS — R33.8 OTHER RETENTION OF URINE: ICD-10-CM

## 2023-01-06 PROCEDURE — 31575 DIAGNOSTIC LARYNGOSCOPY: CPT

## 2023-01-06 PROCEDURE — 99204 OFFICE O/P NEW MOD 45 MIN: CPT | Mod: 25

## 2023-01-06 RX ORDER — DOXEPIN HYDROCHLORIDE 50 MG/1
50 CAPSULE ORAL
Qty: 85 | Refills: 0 | Status: COMPLETED | COMMUNITY
Start: 2022-08-23 | End: 2022-08-29

## 2023-01-06 RX ORDER — FLUTICASONE PROPIONATE 50 UG/1
50 SPRAY, METERED NASAL DAILY
Qty: 1 | Refills: 3 | Status: ACTIVE | COMMUNITY
Start: 2023-01-06 | End: 1900-01-01

## 2023-01-06 RX ORDER — TRIAMTERENE AND HYDROCHLOROTHIAZIDE 37.5; 25 MG/1; MG/1
37.5-25 CAPSULE ORAL
Qty: 30 | Refills: 0 | Status: ACTIVE | COMMUNITY
Start: 2022-08-08

## 2023-01-06 RX ORDER — POLYETHYLENE GLYCOL 3350 17 G/17G
17 POWDER, FOR SOLUTION ORAL
Qty: 14 | Refills: 0 | Status: DISCONTINUED | COMMUNITY
Start: 2022-08-23 | End: 2023-01-06

## 2023-01-06 RX ORDER — LEVOFLOXACIN 500 MG/1
500 TABLET, FILM COATED ORAL
Qty: 5 | Refills: 0 | Status: COMPLETED | COMMUNITY
Start: 2022-09-21 | End: 2022-09-28

## 2023-01-06 NOTE — PROCEDURE
[Video Captured] : video captured and filed [de-identified] : \par Indication: history of laryngeal papilloma\par -Verbal consent was obtained from patient prior to procedure.\par -Zaid-Synephrine and lidocaine 2% spray applied to the nasal cavities.\par Flexible laryngoscopy was performed via right  nostril and revealed the following:\par   -- Nasopharynx had no mass or exudate.\par   -- Base of tongue was symmetric and not enlarged.\par   -- Vallecula was clear.  Uvula long and sometimes touches the epiglottic tip.\par   -- Epiglottis, both aryepiglottic folds and both false vocal folds were normal\par   -- Arytenoids both without edema and erythema \par   -- True vocal folds were fully mobile and without lesions. \par   -- Post cricoid area was clear.\par   -- Interarytenoid edema was present     \par   -- No lesions seen in laryngopharynx\par \par The patient tolerated the procedure well.\par

## 2023-01-06 NOTE — HISTORY OF PRESENT ILLNESS
[de-identified] : Mr. JACKSON is a 62 year old man who was referred by Dr. Morris for throat check.\par PMH:  HTN, BPH\par \par S/p laryngeal papilloma removed on 9/25/14 by Dr. Taylor at [facility] \par No throat pain, difficulty swallowing, pain with swallowing, voice change, difficulty breathing.\par Used to smoke marijuana when young.  No cigarettes/cigars.  Occ EtOH. 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  No meds for nasal congestion.  No postnasal drip, rhinorrhea or facial pressure.\par

## 2023-01-06 NOTE — PHYSICAL EXAM
[] : septum deviated to the left [Midline] : trachea located in midline position [Laryngoscopy Performed] : laryngoscopy was performed, see procedure section for findings [FreeTextEntry1] : No hoarseness.  [de-identified] : Mild inferior turbinate hypertrophy bilateral. [Normal] : palatine tonsils are normal [de-identified] : Full upper denture, partial lower.denture [de-identified] : small [de-identified] : Carotid pulses 2+ bilateral.

## 2023-01-06 NOTE — CONSULT LETTER
[Dear  ___] : Dear  [unfilled], [( Thank you for referring [unfilled] for consultation for _____ )] : Thank you for referring [unfilled] for consultation for [unfilled] [Please see my note below.] : Please see my note below. [Consult Closing:] : Thank you very much for allowing me to participate in the care of this patient.  If you have any questions, please do not hesitate to contact me. [Sincerely,] : Sincerely, [FreeTextEntry2] : Yoon Durán M.D.\par [address] [place] [address]\[place] [address]\par  [FreeTextEntry3] : \par Fanny New MD \par Otolaryngology, Head and Neck Surgery \par \par

## 2023-01-06 NOTE — ASSESSMENT
[FreeTextEntry1] : Mr. JACKSON is a 63 yo man who is s/p excision of an HPV-related laryngeal papilloma in 2014.\par He has no throat complaints, and there was no lesion seen in the laryngopharynx today.\par He is a non-smoker of tobacco.\par \par Chronic nasal congestion is due to inferior turbinate hypertrophy and left septal deviation.\par --> start fluticasone nasal spray\par --> consider septoplasty and inferior turbinate reduction if the congestion is bothersome enough to him.\par \par I would be happy to see him again as needed. \par

## 2023-01-10 ENCOUNTER — APPOINTMENT (OUTPATIENT)
Age: 63
End: 2023-01-10
Payer: COMMERCIAL

## 2023-01-10 VITALS
HEART RATE: 63 BPM | DIASTOLIC BLOOD PRESSURE: 79 MMHG | SYSTOLIC BLOOD PRESSURE: 149 MMHG | WEIGHT: 227 LBS | HEIGHT: 65 IN | BODY MASS INDEX: 37.82 KG/M2 | TEMPERATURE: 97.9 F | OXYGEN SATURATION: 99 %

## 2023-01-10 DIAGNOSIS — G44.86 CERVICOGENIC HEADACHE: ICD-10-CM

## 2023-01-10 DIAGNOSIS — M54.81 OCCIPITAL NEURALGIA: ICD-10-CM

## 2023-01-10 PROCEDURE — 99213 OFFICE O/P EST LOW 20 MIN: CPT

## 2023-01-10 NOTE — PHYSICAL EXAM
[FreeTextEntry1] : General: this is a pleasant patient in no acute distress\par \par HEENT conjunctiva are normal, no tenderness in head\par \par CV: normal pulses, regular rate and rhythm, no peripheral edema noted\par \par Lungs: breathing is non-labored\par \par abd: soft and non-distended\par \par MSK: tense but not tender paraspinals.\par SLR: \par SONYA:\par range of motion: less restricted C spine all movements, now mainly limited lat rot to L\par tinnels: \par spurling:\par Occipital nerve tenderness: neg b/l\par \par Mental status:\par Alert and oriented to person, place and time, normal speech and comprehension\par \par Cranial Nerves:\par extra-occular movements in tact without nystagmus, normal saccades and smooth pursuit, Face symmetric and facial strength symmetric, facial sensation symmetric, \par \par Motor: normal bulk and tone throughout. no abnormal movements.  Full 5/5 strength uppers and lower extremities proximally and distally\par \par Sensory: in tact and symmetric to vibration, light tough, temperature\par \par Cerebellar: normal finger-nose-finger bilaterally\par \par Reflexes: 3+ in the upper and 2+ lower extremities and symmetric.  toes are bilaterally downgoing. + L cosby's\par \par Gait: stable, able to tip toe heel and difficulty with tandem\par \par Stances:\par Romberg: stable\par \par \par

## 2023-01-10 NOTE — HISTORY OF PRESENT ILLNESS
[FreeTextEntry1] : MASSIEL JACKSON is a 62 year who returns to follow up for \par \par last visit was 10/25/2022\par \par since last visit he is doing much better. did neck PT. also feels stress levels are improving. no new symptoms.  occipital pains are occasional now and remit on their own.\par \par \par

## 2023-01-10 NOTE — CONSULT LETTER
[Dear  ___] : Dear  [unfilled], [Courtesy Letter:] : I had the pleasure of seeing your patient, [unfilled], in my office today. [Please see my note below.] : Please see my note below. [Consult Closing:] : Thank you very much for allowing me to participate in the care of this patient.  If you have any questions, please do not hesitate to contact me. [Sincerely,] : Sincerely, [FreeTextEntry3] : Toño Cast mD

## 2023-01-12 ENCOUNTER — APPOINTMENT (OUTPATIENT)
Dept: NEUROLOGY | Facility: CLINIC | Age: 63
End: 2023-01-12